# Patient Record
Sex: MALE | Race: WHITE
[De-identification: names, ages, dates, MRNs, and addresses within clinical notes are randomized per-mention and may not be internally consistent; named-entity substitution may affect disease eponyms.]

---

## 2021-01-15 ENCOUNTER — HOSPITAL ENCOUNTER (OUTPATIENT)
Dept: HOSPITAL 38 - CC.SDS | Age: 61
End: 2021-01-15
Attending: FAMILY MEDICINE
Payer: COMMERCIAL

## 2021-01-15 DIAGNOSIS — Z12.11: Primary | ICD-10-CM

## 2021-01-15 DIAGNOSIS — I10: ICD-10-CM

## 2021-01-15 DIAGNOSIS — K57.30: ICD-10-CM

## 2021-01-15 DIAGNOSIS — Z79.899: ICD-10-CM

## 2021-01-15 DIAGNOSIS — Z88.5: ICD-10-CM

## 2021-01-15 DIAGNOSIS — E78.5: ICD-10-CM

## 2021-01-15 DIAGNOSIS — Z79.82: ICD-10-CM

## 2021-01-15 DIAGNOSIS — Z20.822: ICD-10-CM

## 2021-01-15 DIAGNOSIS — E11.9: ICD-10-CM

## 2021-01-15 DIAGNOSIS — C61: ICD-10-CM

## 2021-01-15 DIAGNOSIS — D12.8: ICD-10-CM

## 2021-01-15 DIAGNOSIS — Z01.812: ICD-10-CM

## 2021-01-15 DIAGNOSIS — Z90.89: ICD-10-CM

## 2021-01-15 DIAGNOSIS — Z79.4: ICD-10-CM

## 2021-01-15 PROCEDURE — U0002 COVID-19 LAB TEST NON-CDC: HCPCS

## 2021-01-15 NOTE — OR
DATE OF OPERATION:  01/15/2021

 

PREOPERATIVE DIAGNOSIS:  SCREENING COLONOSCOPY.

 

POSTOPERATIVE DIAGNOSIS:  SCREENING COLONOSCOPY.

 

SURGEON:  Faustino Tirado MD

 

PROCEDURE:  FULL-LENGTH COLONOSCOPY WITH FORCEPS POLYP REMOVAL X2.

 

ANESTHESIA:  MAC.

 

COMPLICATIONS:  None.

 

SPECIMEN:  Two small sessile polyps in rectal vault, likely hyperplastic.

 

RECOMMENDATIONS:  Followup colonoscopy in 5 years.

 

FINDINGS:  Mild sigmoid diverticulosis.

 

INDICATIONS:  The patient was in for a routine exam.  He has never had a prior

colonoscopy.  We recommended a screening procedure.

 

DESCRIPTION OF PROCEDURE:  The patient was prepped and draped, placed in the

left lateral decubitus position.  A lubricated Olympus colonoscope was inserted

and easily advanced to the cecum.  We were able to directly visualize the

ileocecal valve, palpate and visualize the light in the right lower quadrant.

Upon withdrawal of the scope, the cecum, ascending, transverse, and descending

colons were completely unremarkable.  The patient does have mild diverticular

disease in the sigmoid.  No inflammatory changes were seen.  The rectal vault in

its mid and proximal portion had 2 small sessile polyps, both removed with the

forceps in their entirety.  Retroflexion showed no perianal lesions.  Air was

then suctioned and the scope removed without complication.

 

CORNELIO/DANIEL

DD:  01/15/2021 11:49:03

DT:  01/15/2021 12:12:15

Job #:  [id number]